# Patient Record
(demographics unavailable — no encounter records)

---

## 2024-12-18 NOTE — ASSESSMENT
[FreeTextEntry1] : Assessment CKD stage 3B: Renal function slightly worse She doesn't have any edema Her BP is controlled Electrolytes are acceptable

## 2024-12-18 NOTE — PLAN
[TextEntry] : Plan CKD stage 3B: with slight worsening of her renal function, lower lasix to 20 mg daily however if she is already on 20 mg daily she will continue it, if she is on 40 mg then change to 20 mg and alternate, continue Losartan 25 mg  HTN: Metoprolol 50 mg, Losartan CHF: Continue diuretics Pulmonary fibrosis: chronic, significant changes, seen by pulm in the past  Follow up with recommended workups on May 5th 2025

## 2024-12-18 NOTE — RESULTS/DATA
[TextEntry] : Labs: 11/18/24 132/5.1/94/25/47/1.70/gfr27/ca9.9 6.1/11/533   5/3/24 136/4.6/97/27/28/1.35 egfr 35 ca 9.5 cbc 9.4/13.4/855 9/22/23 135/4.7/97/26/18/1.32 ca 9.6 3.6/11.7/577  6/9/23 139/4.3/101/24/22.9/1.23 40.3 ca 9.7 cbc 3.6/11.8/475  2/10/23 136/5/98/26/30/1.59/ EGFR 30 ca 9.8/ CBC: 6/13/446  9/30/2022: 138/5.1/100/28/70/1.29calcium 10, eGFR 38 6/17/22: 135/4.9/99/24/45/1.41 ca 9.3 eGFR 34 3.7/11.6/306  12/3/21: 139/5.1/103/22/23/1.96 eGFR 46 ca 9.6 CBC: 4.4/12.5/46  6/25/2021 136/4.7/96/26/44/1.75 gfr 24 ca 9.5 cbc 5.4/11.7/440 phos 4.2 vit d 44.3 pth 45    12/1/2021 136/5.2/99/29/30/1.32 gfr 34 ca 9.6 135/5/97/24/41/1.61, GFR 31Magnesium 2.2Phosphorus 3.7CBC:9/10.7/298  12/13/2020: cr 1.17/18/gfr 40 K 4.4/glu 185 hg 13 Ca 9.4/3.1/d 35.4/pth 57 5/11/19:5/11/19: cr 1.21/23/gfr 39 K 4.7/glu 95 Hg 12.9 Ca 9.6/3.2/d 35.5/pth 41  12/14/18:cr 1.13/29/gfr 42 K 4.4/glu 93 hg 12.8 Ca 9.2/3.3/d 20.8 pth 71  6/15/18:cr 1.17/26/gfr 40 K 4.4/glu 88 hg 11.8 Ca 9.2/3.5/d 31.5/pth 44.  12/17/17:cr 1.12/19/gfr 43 K 4.6/glu 99 Ca 9.2/3.3/d 26.8/pth 54 hg 12.4  6/16/17:cr 1.34/36/gfr 34 K 4.6/glu 91 Ca 9.6/3.7/d 35.8/pth 39 hg 12.3

## 2024-12-18 NOTE — REVIEW OF SYSTEMS
[TextEntry] : Review of Systems: general: No weight loss, No recent fever, chills HEENT: no headache, no change in vision or hearing Pulmonary: No SOB, or cough CVS: No chest pain, DEAL, or change in exercise tolerance Gastrointestinal: No Nausea/vomiting/constipation/diarrhea : No complaint of dysuria or urinary frequency, no excess foaming Skin: Denies no new rash, lesions, ulcer Musco skeletal: No edema, cyanosis, or new ulcers Neurological: No headache, dizziness, vertigo

## 2024-12-18 NOTE — PHYSICAL EXAM
[TextEntry] : Physical Examination: General: Not in acute distress Head: Normocephalic, no lesions Eyes: HUGO, Fundi grossly normal Chest: B/L rales  CVS: S1/S2, RR, no murmurs, no rubs Abdomen: Soft NT/ND, +BS Extremities: No edema, ulcers, or cyanosis Neurological: Alert, awake, no gross focal deficits

## 2024-12-18 NOTE — HISTORY OF PRESENT ILLNESS
[FreeTextEntry1] : 12/18/24 reason for visit Follow up for CKD stage 3B HTN No significant interval events  PMH CKD stage HTN Cardiomyopathy  Thrombocytosis AV block  PSH s/p PPM     Ongoing medical problems: 7/30/21:  past medical history of chronic kidney disease stage IIIb to stage IV, hypertension number 30 years, status post permanent pacemaker for high-grade AV block, cardiomyopathy ejection fraction 30 to 35%, thrombocytosis

## 2025-05-14 NOTE — HISTORY OF PRESENT ILLNESS
[FreeTextEntry1] : 05/14/25 reason for visit Follow up for CKD stage 3B HTN  12/18/24 reason for visit Follow up for CKD stage 3B HTN No significant interval events  PMH CKD stage HTN Cardiomyopathy  Thrombocytosis AV block  PSH s/p PPM     Ongoing medical problems: 7/30/21:  past medical history of chronic kidney disease stage IIIb to stage IV, hypertension number 30 years, status post permanent pacemaker for high-grade AV block, cardiomyopathy ejection fraction 30 to 35%, thrombocytosis

## 2025-05-14 NOTE — PLAN
[TextEntry] : Plan CKD stage 3B: Continue Furosemide 20 mg daily, Continue Losartan 25 mg  HTN: Continue Metoprolol 50 mg, Losartan 25 mg Pulmonary fibrosis: chronic, significant changes, seen by pulm in the past  Follow up with recommended workups in 6 months

## 2025-05-14 NOTE — RESULTS/DATA
[TextEntry] : Labs: 4/9/25 135/5.6/98/24/35/1.36/gfr35/ca9.5 UPCR >0.4  11/18/24 132/5.1/94/25/47/1.70/gfr27/ca9.9 6.1/11/533   5/3/24 136/4.6/97/27/28/1.35 egfr 35 ca 9.5 cbc 9.4/13.4/855 9/22/23 135/4.7/97/26/18/1.32 ca 9.6 3.6/11.7/577  6/9/23 139/4.3/101/24/22.9/1.23 40.3 ca 9.7 cbc 3.6/11.8/475  2/10/23 136/5/98/26/30/1.59/ EGFR 30 ca 9.8/ CBC: 6/13/446  9/30/2022: 138/5.1/100/28/70/1.29calcium 10, eGFR 38 6/17/22: 135/4.9/99/24/45/1.41 ca 9.3 eGFR 34 3.7/11.6/306  12/3/21: 139/5.1/103/22/23/1.96 eGFR 46 ca 9.6 CBC: 4.4/12.5/46  6/25/2021 136/4.7/96/26/44/1.75 gfr 24 ca 9.5 cbc 5.4/11.7/440 phos 4.2 vit d 44.3 pth 45    12/1/2021 136/5.2/99/29/30/1.32 gfr 34 ca 9.6 135/5/97/24/41/1.61, GFR 31Magnesium 2.2Phosphorus 3.7CBC:9/10.7/298  12/13/2020: cr 1.17/18/gfr 40 K 4.4/glu 185 hg 13 Ca 9.4/3.1/d 35.4/pth 57 5/11/19:5/11/19: cr 1.21/23/gfr 39 K 4.7/glu 95 Hg 12.9 Ca 9.6/3.2/d 35.5/pth 41  12/14/18:cr 1.13/29/gfr 42 K 4.4/glu 93 hg 12.8 Ca 9.2/3.3/d 20.8 pth 71  6/15/18:cr 1.17/26/gfr 40 K 4.4/glu 88 hg 11.8 Ca 9.2/3.5/d 31.5/pth 44.  12/17/17:cr 1.12/19/gfr 43 K 4.6/glu 99 Ca 9.2/3.3/d 26.8/pth 54 hg 12.4  6/16/17:cr 1.34/36/gfr 34 K 4.6/glu 91 Ca 9.6/3.7/d 35.8/pth 39 hg 12.3

## 2025-05-14 NOTE — ASSESSMENT
[FreeTextEntry1] : Assessment CKD stage 3B: Renal function improved HTN: controlled No significant proteinuria  No peripheral edema